# Patient Record
Sex: FEMALE | Race: WHITE
[De-identification: names, ages, dates, MRNs, and addresses within clinical notes are randomized per-mention and may not be internally consistent; named-entity substitution may affect disease eponyms.]

---

## 2022-01-09 ENCOUNTER — HOSPITAL ENCOUNTER (EMERGENCY)
Dept: HOSPITAL 33 - ED | Age: 22
Discharge: HOME | End: 2022-01-09
Payer: COMMERCIAL

## 2022-01-09 VITALS — HEART RATE: 93 BPM | SYSTOLIC BLOOD PRESSURE: 120 MMHG | DIASTOLIC BLOOD PRESSURE: 81 MMHG

## 2022-01-09 VITALS — OXYGEN SATURATION: 97 %

## 2022-01-09 DIAGNOSIS — O98.512: Primary | ICD-10-CM

## 2022-01-09 DIAGNOSIS — B97.29: ICD-10-CM

## 2022-01-09 DIAGNOSIS — J02.8: ICD-10-CM

## 2022-01-09 DIAGNOSIS — U07.1: ICD-10-CM

## 2022-01-09 DIAGNOSIS — Z3A.16: ICD-10-CM

## 2022-01-09 DIAGNOSIS — R05.9: ICD-10-CM

## 2022-01-09 LAB
ALBUMIN SERPL-MCNC: 3.5 G/DL (ref 3.5–5)
ALP SERPL-CCNC: 57 U/L (ref 38–126)
ALT SERPL-CCNC: 22 U/L (ref 0–35)
ANION GAP SERPL CALC-SCNC: 11.4 MEQ/L (ref 5–15)
AST SERPL QL: 26 U/L (ref 14–36)
BILIRUB BLD-MCNC: 0.9 MG/DL (ref 0.2–1.3)
BUN SERPL-MCNC: 2 MG/DL (ref 7–17)
CALCIUM SPEC-MCNC: 8.8 MG/DL (ref 8.4–10.2)
CELLS COUNTED: 100
CHLORIDE SERPL-SCNC: 104 MMOL/L (ref 98–107)
CO2 SERPL-SCNC: 23 MMOL/L (ref 22–30)
CREAT SERPL-MCNC: 0.29 MG/DL (ref 0.52–1.04)
GFR SERPLBLD BASED ON 1.73 SQ M-ARVRAT: > 60 ML/MIN
GLUCOSE SERPL-MCNC: 94 MG/DL (ref 74–106)
GLUCOSE UR-MCNC: NEGATIVE MG/DL
HCT VFR BLD AUTO: 34.4 % (ref 35–47)
HGB BLD-MCNC: 11.6 GM/DL (ref 12–16)
MANUAL DIF COMMENT BLD-IMP: NORMAL
MCH RBC QN AUTO: 30.9 PG (ref 26–32)
MCHC RBC AUTO-ENTMCNC: 33.7 G/DL (ref 32–36)
NEUTS BAND # BLD MANUAL: 1 % (ref 0–2)
PLATELET # BLD AUTO: 222 K/MM3 (ref 150–450)
POTASSIUM SERPLBLD-SCNC: 3.3 MMOL/L (ref 3.5–5.1)
PROT SERPL-MCNC: 6.8 G/DL (ref 6.3–8.2)
PROT UR STRIP-MCNC: NEGATIVE MG/DL
RBC # BLD AUTO: 3.76 M/MM3 (ref 4.1–5.4)
RBC #/AREA URNS HPF: (no result) /HPF (ref 0–2)
SODIUM SERPL-SCNC: 135 MMOL/L (ref 137–145)
TOXIC GRANULES BLD QL SMEAR: (no result)
WBC # BLD AUTO: 10.1 K/MM3 (ref 4–10.5)

## 2022-01-09 PROCEDURE — 80053 COMPREHEN METABOLIC PANEL: CPT

## 2022-01-09 PROCEDURE — U0003 INFECTIOUS AGENT DETECTION BY NUCLEIC ACID (DNA OR RNA); SEVERE ACUTE RESPIRATORY SYNDROME CORONAVIRUS 2 (SARS-COV-2) (CORONAVIRUS DISEASE [COVID-19]), AMPLIFIED PROBE TECHNIQUE, MAKING USE OF HIGH THROUGHPUT TECHNOLOGIES AS DESCRIBED BY CMS-2020-01-R: HCPCS

## 2022-01-09 PROCEDURE — 96374 THER/PROPH/DIAG INJ IV PUSH: CPT

## 2022-01-09 PROCEDURE — 85025 COMPLETE CBC W/AUTO DIFF WBC: CPT

## 2022-01-09 PROCEDURE — 81001 URINALYSIS AUTO W/SCOPE: CPT

## 2022-01-09 PROCEDURE — 36415 COLL VENOUS BLD VENIPUNCTURE: CPT

## 2022-01-09 PROCEDURE — 96375 TX/PRO/DX INJ NEW DRUG ADDON: CPT

## 2022-01-09 PROCEDURE — 93005 ELECTROCARDIOGRAM TRACING: CPT

## 2022-01-09 PROCEDURE — 36000 PLACE NEEDLE IN VEIN: CPT

## 2022-01-09 PROCEDURE — 99284 EMERGENCY DEPT VISIT MOD MDM: CPT

## 2022-01-09 PROCEDURE — 87086 URINE CULTURE/COLONY COUNT: CPT

## 2022-01-09 NOTE — ERPHSYRPT
- History of Present Illness


Time Seen by Provider: 01/09/22 10:37


Source: patient


Exam Limitations: no limitations


Patient Subjective Stated Complaint: Patient c/o cough x 2 months, worsened this

morning. States she tested positive for COVID 1/4. Reports no fevers. Cannot 

smell. States her throat started hurting this morning. Has a "running nose" and 

mucus is yellow.


Triage Nursing Assessment: Patient to ED with complaints of congestion, cough, 

and sore throat. Patient breathing easy at this time. VS wnl, occasional wet 

cough. Breath sounds clear bilaterally.


Physician History: 





Location: generalized


Quality: Malaise, sore throat


Radiation: none


Severity: mild


Duration: several weeks


Timing: gradual 


Modifying factors/associated signs and symptoms: Previous positive at home COVID

test





Patient here with viral pharyngitis in setting of a positive COVID test.  Also 

complains of coughing.  Patient is 16 weeks pregnant.  She has not received the 

vaccine.  She has not seen her PCP for this or her OB/GYN.


Timing/Duration: week(s)


Severity: mild


Modifying Factors: Improves With: other


Associated Symptoms: denies symptoms


Allergies/Adverse Reactions: 








amoxicillin [Amoxicillin] Allergy (Mild, Verified 01/09/22 11:01)


   


Penicillins Allergy (Mild, Verified 01/09/22 11:01)


   





Hx Tetanus, Diphtheria Vaccination/Date Given: Yes


Hx Influenza Vaccination/Date Given: No


Hx Pneumococcal Vaccination/Date Given: No


Immunizations Up to Date: Yes





Travel Risk





- International Travel


Have you traveled outside of the country in past 3 weeks: No





- Coronavirus Screening


Are you exhibiting any of the following symptoms?: Yes


Symptoms: Cough: New Onset, Shortness of Breath, Loss of Taste or Smell





- Vaccine Status


Have you recieved a Covid-19 vaccination: No





- Review of Systems


Constitutional: No Fever, No Chills


Eyes: No Symptoms


Ears, Nose, & Throat: No Symptoms, Other (sore throat )


Respiratory: Cough, No Dyspnea


Cardiac: No Chest Pain, No Edema, No Syncope


Abdominal/Gastrointestinal: No Abdominal Pain, No Nausea, No Vomiting, No 

Diarrhea


Genitourinary Symptoms: No Dysuria


Musculoskeletal: No Back Pain, No Neck Pain


Skin: No Rash


Neurological: No Dizziness, No Focal Weakness, No Sensory Changes


Psychological: No Symptoms


Endocrine: No Symptoms


All Other Systems: Reviewed and Negative





- Past Medical History


Pertinent Past Medical History: Yes


Respiratory History: Asthma





- Past Surgical History


Past Surgical History: Yes


Musculoskeletal: Orthopedic Surgery


Other Surgical History: wrist surgery 2012





- Social History


Smoking Status: Never smoker


Exposure to second hand smoke: Yes


Drug Use: none


Patient Lives Alone: No





- Female History


Hx Last Menstrual Period: 9/12/21


Hx Pregnant Now: Yes


Expected Date of Delivery: 06/21/22


Gestational Age: 14





- Nursing Vital Signs


Nursing Vital Signs: 


                               Initial Vital Signs











Temperature  98 F   01/09/22 10:46


 


Pulse Rate  110 H  01/09/22 10:46


 


Respiratory Rate  18   01/09/22 10:46


 


Blood Pressure  151/93   01/09/22 10:46


 


O2 Sat by Pulse Oximetry  97   01/09/22 10:46








                                   Pain Scale











Pain Intensity                 0

















- Physical Exam


General Appearance: no apparent distress, alert


Eye Exam: PERRL/EOMI, eyes nml inspection


Ears, Nose, Throat Exam: normal ENT inspection, TMs normal, pharynx normal, 

moist mucous membranes


Neck Exam: normal inspection, non-tender, supple, full range of motion


Respiratory Exam: normal breath sounds, lungs clear, No respiratory distress


Cardiovascular Exam: regular rate/rhythm, normal heart sounds, normal peripheral

 pulses


Gastrointestinal/Abdomen Exam: soft, normal bowel sounds, No tenderness, No mass


Back Exam: normal inspection, normal range of motion, No CVA tenderness, No 

vertebral tenderness


Extremity Exam: normal inspection, normal range of motion, pelvis stable


Neurologic Exam: alert, oriented x 3, cooperative, normal mood/affect, nml cereb

ellar function, nml station & gait, sensation nml, No motor deficits


Skin Exam: normal color, warm, dry, No rash


Lymphatic Exam: No adenopathy


**SpO2 Interpretation**: normal


SpO2: 97


Comments: 





01/09/22 11:46


No trismus, able to fully extend neck, normal range of motion of neck without 

pain. Uvula is midline, no


swelling of the mouth, noraml oropharynx. No exudate, no signs of meningitis, no

 floor of mouth


swelling, no hot potato voice on exam. No buccal swelling, no gum bleeding, no 

signs of tooth


abscess/infection.





No obvious deformity, sensation intact, 2+ capillary refill, 2 point tactile 

discrimination intact. 5 out of 5


strength. Full range of motion without pain. Compartments are soft, nontender. 

Overlying skin shows


no tenting, bruising, ecchymosis.





- Course


Nursing assessment & vital signs reviewed: Yes


EKG Interpreted by Me: Sinus Rhythm


Ordered Tests: 


                               Active Orders 24 hr











 Category Date Time Status


 


 EKG-ER Only STAT Care  01/09/22 11:00 Completed


 


 IV Insertion STAT Care  01/09/22 11:00 Completed


 


 CBC W DIFF Stat Lab  01/09/22 11:20 Completed


 


 CMP Stat Lab  01/09/22 11:20 Completed


 


 CULTURE,URINE Stat Lab  01/09/22 11:20 Received


 


 Manual Differential NC Stat Lab  01/09/22 11:20 Completed


 


 UA W/RFX UR CULTURE Stat Lab  01/09/22 11:20 Completed








Medication Summary














Discontinued Medications














Generic Name Dose Route Start Last Admin





  Trade Name Freq  PRN Reason Stop Dose Admin


 


Dexamethasone Sodium Phosphate  8 mg  01/09/22 11:01  01/09/22 11:13





  Dexamethasone Sod Phosphate 10 Mg/Ml  IV  01/09/22 11:02  8 mg





  STAT ONE   Administration


 


Dexamethasone Sodium Phosphate  Confirm  01/09/22 11:12 





  Dexamethasone Sod Phosphate 10 Mg/Ml  Administered  01/09/22 11:13 





  Dose  





  10 mg  





  .ROUTE  





  .STK-MED ONE  


 


Sodium Chloride  1,000 mls @ 999 mls/hr  01/09/22 11:00  01/09/22 11:13





  Sodium Chloride 0.9% 1000 Ml  IV  01/09/22 12:00  999 mls/hr





  .Q1H1M STA   Administration


 


Sodium Chloride  Confirm  01/09/22 11:12 





  Sodium Chloride 0.9% 1000 Ml  Administered  01/09/22 11:13 





  Dose  





  1,000 mls @ ud  





  .ROUTE  





  .STK-MED ONE  


 


Ondansetron HCl  4 mg  01/09/22 11:00  01/09/22 11:14





  Ondansetron Hcl 4 Mg/2 Ml Vial  IV  01/09/22 11:01  4 mg





  STAT ONE   Administration


 


Ondansetron HCl  Confirm  01/09/22 11:12 





  Ondansetron Hcl 4 Mg/2 Ml Vial  Administered  01/09/22 11:13 





  Dose  





  4 mg  





  .ROUTE  





  .STK-MED ONE  











Lab/Rad Data: 


                           Laboratory Result Diagrams





                                 01/09/22 11:20 





                                 01/09/22 11:20 





                               Laboratory Results











  01/09/22 01/09/22 01/09/22 Range/Units





  11:20 11:20 11:20 


 


WBC   10.1   (4.0-10.5)  K/mm3


 


RBC   3.76 L   (4.1-5.4)  M/mm3


 


Hgb   11.6 L   (12.0-16.0)  gm/dl


 


Hct   34.4 L   (35-47)  %


 


MCV   91.5   ()  fl


 


MCH   30.9   (26-32)  pg


 


MCHC   33.7   (32-36)  g/dl


 


RDW   13.3   (11.5-14.0)  %


 


Plt Count   222   (150-450)  K/mm3


 


MPV   10.6   (7.5-11.0)  fl


 


Segmented Neutrophils   53   (36.0-66.0)  %


 


Band Neutrophils   1   (0.0-2.0)  %


 


Lymphocytes (Manual)   42   (24-44)  %


 


Monocytes (Manual)   3   (0.0-12.0)  %


 


Eosinophils (Manual)   1   (0.00-3.0)  %


 


Toxic Granulation   1+   


 


Platelet Estimate   NORMAL   (NORMAL)  


 


RBC Morphology   NORMAL   


 


Sodium  135 L    (137-145)  mmol/L


 


Potassium  3.3 L    (3.5-5.1)  mmol/L


 


Chloride  104    ()  mmol/L


 


Carbon Dioxide  23    (22-30)  mmol/L


 


Anion Gap  11.4    (5-15)  MEQ/L


 


BUN  2 L    (7-17)  mg/dL


 


Creatinine  0.29 L    (0.52-1.04)  mg/dL


 


Estimated GFR  > 60.0    ML/MIN


 


Glucose  94    ()  mg/dL


 


Calcium  8.8    (8.4-10.2)  mg/dL


 


Total Bilirubin  0.90    (0.2-1.3)  mg/dL


 


AST  26    (14-36)  U/L


 


ALT  22    (0-35)  U/L


 


Alkaline Phosphatase  57    ()  U/L


 


Serum Total Protein  6.8    (6.3-8.2)  g/dL


 


Albumin  3.5    (3.5-5.0)  g/dL


 


Urine Color    KAN  (YELLOW)  


 


Urine Appearance    CLOUDY  (CLEAR)  


 


Urine pH    6.0  (5-6)  


 


Ur Specific Gravity    1.014  (1.005-1.025)  


 


Urine Protein    NEGATIVE  (Negative)  


 


Urine Ketones    NEGATIVE  (NEGATIVE)  


 


Urine Blood    NEGATIVE  (0-5)  Sanju/ul


 


Urine Nitrite    NEGATIVE  (NEGATIVE)  


 


Urine Bilirubin    NEGATIVE  (NEGATIVE)  


 


Urine Urobilinogen    4  (0-1)  mg/dL


 


Ur Leukocyte Esterase    LARGE  (NEGATIVE)  


 


Urine WBC (Auto)    11-15  (0-5)  /HPF


 


Urine RBC (Auto)    6-10  (0-2)  /HPF


 


U Epithel Cells (Auto)    MANY  (FEW)  /HPF


 


Urine Bacteria (Auto)    FEW  (NEGATIVE)  /HPF


 


Urine Mucus (Auto)    SLIGHT  (NEGATIVE)  /HPF


 


Urine Culture Reflexed    YES  (NO)  


 


Urine Glucose    NEGATIVE  (NEGATIVE)  mg/dL














- Progress


Progress: improved


Progress Note: 





01/09/22 11:46


We will start an IV, obtain basic labs.  Will give fluids, steroids.  Lungs 

sound clear.  Therefore will not give a breathing treatment or obtain a chest x-

ray.  In the setting of pregnancy we also want to avoid radiation.  Very low 

suspicion for pulmonary embolism or other blood clot.  No other falls or trauma.


01/09/22 15:31


Patient feels improved with medications here.  Plan to discharge patient home at

 this point time.  Follow-up with her PCP.  She return here for any new or 

changing symptoms.





Plan of care was discussed with patient and all questions answered. The patient 

is agreeable to be


discharged home and both verbal and printed discharge instructions were 

provided.The patient agreed


to seek outpatient follow up as discussed. The patient was given strict 

instructions to return to the


emergency department for worsening symptoms or any other emergent concerns. The 

patient


verbalized understanding.





- Departure


Departure Disposition: Home


Clinical Impression: 


 Viral pharyngitis





Condition: Stable


Critical Care Time: No


Referrals: 


TEQUILA SANCHEZ MD [Primary Care Provider] - Follow up/PCP as directed


Instructions:  Cough, Adult (DC)


Additional Instructions: 


See your PCP and OB/GYN for reexam in 24 to 48 hours.  Return here for any new 

or changing symptoms.  Follow-up on the urine culture for your urinalysis today 

with your OB/GYN.

## 2022-06-21 ENCOUNTER — HOSPITAL ENCOUNTER (INPATIENT)
Dept: HOSPITAL 33 - OB | Age: 22
LOS: 3 days | Discharge: HOME | End: 2022-06-24
Attending: FAMILY MEDICINE | Admitting: FAMILY MEDICINE
Payer: COMMERCIAL

## 2022-06-21 DIAGNOSIS — Z3A.40: ICD-10-CM

## 2022-06-21 DIAGNOSIS — D72.829: ICD-10-CM

## 2022-06-21 DIAGNOSIS — R50.9: ICD-10-CM

## 2022-06-21 DIAGNOSIS — Z20.828: ICD-10-CM

## 2022-06-21 LAB
ABO GROUP BLD: (no result)
AMPHETAMINES UR QL: NEGATIVE
BARBITURATES UR QL: NEGATIVE
BASOPHILS # BLD AUTO: 0.04 X10^3/UL (ref 0–0.4)
BENZODIAZ UR QL SCN: NEGATIVE
COCAINE UR QL SCN: NEGATIVE
EOSINOPHIL # BLD AUTO: 0.04 X10^3/UL (ref 0–0.5)
HCT VFR BLD AUTO: 35.2 % (ref 35–47)
HGB BLD-MCNC: 11.8 G/DL (ref 12–16)
LYMPHOCYTES # SPEC AUTO: 2.93 X10^3/UL (ref 1–4.6)
MCH RBC QN AUTO: 31.1 PG (ref 26–32)
MCHC RBC AUTO-ENTMCNC: 33.5 G/DL (ref 32–36)
METHADONE UR QL: NEGATIVE
MONOCYTES # BLD AUTO: 1 X10^3/UL (ref 0–1.3)
OPIATES UR QL: NEGATIVE
PCP UR QL CFM>20 NG/ML: NEGATIVE
PLATELET # BLD AUTO: 233 X10^3/UL (ref 150–450)
RBC # BLD AUTO: 3.79 X10^6/UL (ref 4.1–5.4)
RH BLD: POSITIVE
THC UR QL SCN: NEGATIVE
WBC # BLD AUTO: 13.2 X10^3/UL (ref 4–10.5)

## 2022-06-21 PROCEDURE — 87086 URINE CULTURE/COLONY COUNT: CPT

## 2022-06-21 PROCEDURE — 86900 BLOOD TYPING SEROLOGIC ABO: CPT

## 2022-06-21 PROCEDURE — 85730 THROMBOPLASTIN TIME PARTIAL: CPT

## 2022-06-21 PROCEDURE — 87389 HIV-1 AG W/HIV-1&-2 AB AG IA: CPT

## 2022-06-21 PROCEDURE — 85610 PROTHROMBIN TIME: CPT

## 2022-06-21 PROCEDURE — 90715 TDAP VACCINE 7 YRS/> IM: CPT

## 2022-06-21 PROCEDURE — 86901 BLOOD TYPING SEROLOGIC RH(D): CPT

## 2022-06-21 PROCEDURE — 87340 HEPATITIS B SURFACE AG IA: CPT

## 2022-06-21 PROCEDURE — 81003 URINALYSIS AUTO W/O SCOPE: CPT

## 2022-06-21 PROCEDURE — 36415 COLL VENOUS BLD VENIPUNCTURE: CPT

## 2022-06-21 PROCEDURE — 86850 RBC ANTIBODY SCREEN: CPT

## 2022-06-21 PROCEDURE — 81015 MICROSCOPIC EXAM OF URINE: CPT

## 2022-06-21 PROCEDURE — 76937 US GUIDE VASCULAR ACCESS: CPT

## 2022-06-21 PROCEDURE — 64488 TAP BLOCK BI INJECTION: CPT

## 2022-06-21 PROCEDURE — 80307 DRUG TEST PRSMV CHEM ANLYZR: CPT

## 2022-06-21 PROCEDURE — 85025 COMPLETE CBC W/AUTO DIFF WBC: CPT

## 2022-06-21 PROCEDURE — G0378 HOSPITAL OBSERVATION PER HR: HCPCS

## 2022-06-21 PROCEDURE — 90471 IMMUNIZATION ADMIN: CPT

## 2022-06-21 PROCEDURE — 94799 UNLISTED PULMONARY SVC/PX: CPT

## 2022-06-21 PROCEDURE — 76942 ECHO GUIDE FOR BIOPSY: CPT

## 2022-06-21 PROCEDURE — 62322 NJX INTERLAMINAR LMBR/SAC: CPT

## 2022-06-22 LAB
APTT PPP: 25.5 SECONDS (ref 25.1–36.5)
CRYSTALS UNIDENTIFIED: (no result) /HPF
GLUCOSE UR-MCNC: NEGATIVE MG/DL
INR PPP: 0.98 (ref 0.8–3)
PROT UR STRIP-MCNC: 30 MG/DL
PROTHROMBIN TIME: 10.4 SECONDS (ref 9.4–12.5)
RBC # UR AUTO: (no result) ERY/UL (ref 0–5)
RBC #/AREA URNS HPF: >101 /HPF (ref 0–2)
UA DIPSTICK PNL UR: (no result)
URINE CULTURED INDICATED?: YES

## 2022-06-22 RX ADMIN — SODIUM CHLORIDE, SODIUM LACTATE, POTASSIUM CHLORIDE, CALCIUM CHLORIDE AND DEXTROSE MONOHYDRATE SCH MLS/HR: 5; 600; 310; 30; 20 INJECTION, SOLUTION INTRAVENOUS at 22:09

## 2022-06-23 LAB
BASOPHILS # BLD AUTO: 0.03 X10^3/UL (ref 0–0.4)
BLD SMEAR INTERP: YES
EOSINOPHIL # BLD AUTO: 0.01 X10^3/UL (ref 0–0.5)
HCT VFR BLD AUTO: 30.9 % (ref 35–47)
HGB BLD-MCNC: 10.1 G/DL (ref 12–16)
LYMPHOCYTES # SPEC AUTO: 2.37 X10^3/UL (ref 1–4.6)
MCH RBC QN AUTO: 30.6 PG (ref 26–32)
MCHC RBC AUTO-ENTMCNC: 32.7 G/DL (ref 32–36)
MONOCYTES # BLD AUTO: 1.56 X10^3/UL (ref 0–1.3)
PLATELET # BLD AUTO: 195 X10^3/UL (ref 150–450)
RBC # BLD AUTO: 3.3 X10^6/UL (ref 4.1–5.4)
WBC # BLD AUTO: 17 X10^3/UL (ref 4–10.5)

## 2022-06-23 RX ADMIN — Medication SCH MG: at 08:36

## 2022-06-23 RX ADMIN — HYDROCODONE BITARTRATE AND ACETAMINOPHEN PRN TAB: 5; 325 TABLET ORAL at 21:04

## 2022-06-23 RX ADMIN — HYDROCODONE BITARTRATE AND ACETAMINOPHEN PRN TAB: 5; 325 TABLET ORAL at 10:24

## 2022-06-23 RX ADMIN — SODIUM CHLORIDE, SODIUM LACTATE, POTASSIUM CHLORIDE, CALCIUM CHLORIDE AND DEXTROSE MONOHYDRATE SCH MLS/HR: 5; 600; 310; 30; 20 INJECTION, SOLUTION INTRAVENOUS at 05:59

## 2022-06-23 RX ADMIN — DOCUSATE SODIUM SCH MG: 100 CAPSULE, LIQUID FILLED ORAL at 21:05

## 2022-06-23 RX ADMIN — IBUPROFEN PRN MG: 400 TABLET ORAL at 12:11

## 2022-06-23 RX ADMIN — CLINDAMYCIN IN 5 PERCENT DEXTROSE SCH MLS/HR: 12 INJECTION, SOLUTION INTRAVENOUS at 13:52

## 2022-06-23 RX ADMIN — CLINDAMYCIN IN 5 PERCENT DEXTROSE SCH MLS/HR: 12 INJECTION, SOLUTION INTRAVENOUS at 08:37

## 2022-06-23 RX ADMIN — DOCUSATE SODIUM SCH MG: 100 CAPSULE, LIQUID FILLED ORAL at 08:36

## 2022-06-23 RX ADMIN — CLINDAMYCIN IN 5 PERCENT DEXTROSE SCH MLS/HR: 12 INJECTION, SOLUTION INTRAVENOUS at 22:01

## 2022-06-23 RX ADMIN — DOCUSATE SODIUM SCH: 100 CAPSULE, LIQUID FILLED ORAL at 05:22

## 2022-06-23 RX ADMIN — SODIUM CHLORIDE, SODIUM LACTATE, POTASSIUM CHLORIDE, CALCIUM CHLORIDE AND DEXTROSE MONOHYDRATE SCH MLS/HR: 5; 600; 310; 30; 20 INJECTION, SOLUTION INTRAVENOUS at 13:51

## 2022-06-23 RX ADMIN — IBUPROFEN PRN MG: 400 TABLET ORAL at 23:09

## 2022-06-23 NOTE — OP
SURGERY DATE:   2022



SURGERY TIME:    



PREOPERATIVE DIAGNOSES:    

1.  FAILURE TO PROGRESS IN LABOR.



POSTOPERATIVE DIAGNOSES: 

1.  FAILURE TO PROGRESS IN LABOR.

2.  ASYNCLITIC FETAL PRESENTATION.  



PROCEDURE:     

1.  Primary low transverse  section. 



SURGEON:      [place] M.D.



ANESTHESIA:    Spinal.



ESTIMATED BLOOD LOSS:    400 ml. 



URINE:   200 ml of clear urine.



SPECIMEN:  

1.  Placenta was sent for pathology.



DESCRIPTION OF PROCEDURE:  After informed written consent was obtained, the patient was 
taken to the OR. She had her previously placed laboring epidural dosed for surgery and 
then she was prepped and draped in the usual sterile fashion. After adequate level of 
anesthesia was assessed, a low transverse skin incision was made by knife and carried down 
through the subcutaneous fat to the level of the fascia. The fascia was nicked on both 
sides of the midline and extended horizontal using curved Baker scissors. The superior free 
edge of the fascia was then grasped with Kocher clamps and the underlying rectus muscles 
were dissected free. The same was repeated inferiorly. Next, the peritoneal cavity was 
opened and extended horizontal in a blunt fashion. Bladder blade was inserted and a 
bladder flap was reflected over the lower uterine segment. A horizontal uterine incision 
was made by knife and carried down to the level of the amniotic membranes which were 
carefully artificially ruptured and the incision was extended horizontal. A viable female 
infant was delivered from the vertex presentation with significant caput on the left 
parietal region of the fetal scalp consistent with asynclitic fetal presentation which was 
suspected prior to coming to the OR. The cord was clamped and cut and she was handed off 
to the awaiting nursery team with a good strong cry immediately after delivery. The 
placenta was manually extracted and the uterus was exteriorized. The uterine cavity was 
sponge curetted clean with a Lap sponge. Then, the uterine incision was closed with #1 
chromic in a running, locked fashion. Good closure and good hemostasis were achieved at 
this level. The posterior cul-de-sac was wiped free of blood and clot with a moist Lap 
sponge and the uterus was returned to the peritoneal cavity. Lateral gutters were wiped 
free of blood and clot. There was a small area of oozing in the medial aspect of the 
uterine incision which was remedied with 2-0 Vicryl figure of 8 suture with good 
hemostasis achieved. Next, the fascia was closed with 0 Vicryl in a running fashion. Good 
closure and good hemostasis were achieved at that level. Subcutaneous fat was irrigated 
with warm, sterile saline. Any areas of bleeding were cauterized with electrocautery. 
Finally, the skin layer was closed with 4-0, undyed Vicryl in a running subcuticular 
fashion. Steri-Strips and occlusive dressing were placed over the incision and the patient 
was transferred to the recovery room in good condition.

## 2022-06-23 NOTE — PCM.NOTE
Date and Time: 22





Subjective Assessment: 





nursing reports low grade fever overnight, there was some question of foul odor 

when AROM. no overt chorio present at section. pain is controlled, mild lochia. 

has not been out of bed as of yet





Objective Exam


General Appearance: no apparent distress, alert


Respiratory Exam: normal breath sounds, lungs clear, No respiratory distress


Cardiovascular Exam: regular rate/rhythm, normal heart sounds


Gastrointestinal/Abdomen Exam: soft, other (dressing clean, dry, intact. fundus 

firm below umbilicus), No tenderness, No mass


Extremity Exam: normal inspection, normal range of motion





OBJECTIVE DATA


Vital Signs: 


                               Vital Signs - 24 hr











  Temp Pulse Resp BP BP Pulse Ox


 


 22 06:53   93 H  18   130/62  94 L


 


 22 06:00   98 H  18   134/67  95


 


 22 05:00       95


 


 22 03:58  99 F  100 H  20   129/68  97


 


 22 03:00       95


 


 22 02:00   112 H  18   123/63  98


 


 22 01:00       95


 


 22 00:00       95


 


 22 23:00  0.1 F  90  18   123/60  97


 


 22 22:00  99 F  90  18   120/56  97


 


 22 21:00  98.6 F  109 H  20   113/58  97


 


 22 20:30  98.5 F  116 H  20   116/56  97


 


 22 20:00  98.5 F  103 H  18   128/60  97


 


 22 19:45  98.5 F  103 H  18   128/60  97


 


 22 19:30  98.5 F  103 H  18   128/60  97


 


 22 19:15  100.5 F  128 H  18   142/62  98


 


 22 17:30  98.3 F  80  16  122/67   98


 


 22 17:24  98.3 F  85  16   122/77  98


 


 22 17:00  98.3 F  80  16  122/67   98


 


 22 16:53  98.3 F  85  16  122/77   98


 


 22 16:45  98.3 F  90  16  122/77   98


 


 22 16:30  98.3 F  85  16  122/77   98


 


 22 16:15  99.1 F  93 H  16  114/64   98


 


 22 16:00  98.3 F  85  16  130/78  122/77  98


 


 22 15:45  99.1 F  94 H  16  117/69   98


 


 22 15:30  99.1 F  117 H  16  117/69   97


 


 22 15:15  99.1 F  94 H  16  121/73   97


 


 22 15:00  99.1 F  93 H  16  114/76   97


 


 22 14:45  99.1 F  104 H  18  114/76   98


 


 22 14:30  99.1 F  92 H  20  103/64   98


 


 22 14:15  99.1 F  93 H  20  107/60   99


 


 22 14:00   83  20  115/66   99


 


 22 13:45   72  20  110/62   99


 


 22 13:30   78  20  106/56   99


 


 22 13:15   91 H  18  111/67   100


 


 22 12:45   90  18  111/69   100


 


 22 12:30   89  18  111/76   100


 


 22 12:15   81  18  102/65   100


 


 22 12:00  99 F  77  18  99/61  100/60  100


 


 22 11:45  99 F  89  18  95/57   100


 


 22 11:30  99 F  90  20  91/56   100


 


 22 11:15  99 F  85  20  138/87   99


 


 22 11:00  97.6 F  81  20  142/83   99


 


 22 10:45  97.6 F  72  18  133/92   99


 


 22 10:30  97.6 F  71  16  137/93   99


 


 22 10:15  97.6 F  74  16  135/87   99


 


 22 10:00  97.6 F  76  16  115/58   99


 


 22 09:45  97.6 F  76  16  115/58   99


 


 22 09:30  97.6 F  74  16  112/70   99


 


 22 09:15  97.6 F  77  16  124/73   99


 


 22 09:00  97.6 F  76  16  125/75   99


 


 22 08:45  97.6 F  75  16  122/76   99


 


 22 08:30  97.6 F  83  16  120/73   99


 


 22 08:15  97.6 F  76  16  126/87   99








                        Pain Assessment - Last Documented











Pain Intensity [Bilateral]     0


 


Pain Intensity                 0


 


Pain Scale Used                0-10 Pain Scale











Intake and Output: 


                                 Intake & Output











 22





 11:59 11:59 11:59 11:59


 


Intake Total   2300 4960


 


Output Total    1850


 


Balance   2300 3110


 


Weight   107.048 kg 107.048 kg











Lab Results: 


                            Lab Results-Last 24 Hours











  22 Range/Units





  17:58 13:41 17:27 


 


WBC     (4.0-10.5)  x10^3/uL


 


RBC     (4.1-5.4)  x10^6/uL


 


Hgb     (12.0-16.0)  g/dL


 


Hct     (35-47)  %


 


MCV     ()  fL


 


MCH     (26-32)  pg


 


MCHC     (32-36)  g/dL


 


RDW     (11.5-14.0)  %


 


Plt Count     (150-450)  x10^3/uL


 


MPV     (7.5-11.0)  fL


 


Gran %     (36.0-66.0)  %


 


Immature Gran % (Auto)     (0.00-0.4)  %


 


Nucleat RBC Rel Count     (0.00-0.1)  %


 


Eos # (Auto)     (0-0.5)  x10^3/uL


 


Immature Gran # (Auto)     (0.00-0.03)  x10^3u/L


 


Absolute Lymphs (auto)     (1.0-4.6)  x10^3/uL


 


Absolute Monos (auto)     (0.0-1.3)  x10^3/uL


 


Absolute Nucleated RBC     (0.00-0.01)  x10^3u/L


 


Lymphocytes %     (24.0-44.0)  %


 


Monocytes %     (0.0-12.0)  %


 


Eosinophils %     (0.00-5.0)  %


 


Basophils %     (0.0-0.4)  %


 


Absolute Granulocytes     (1.4-6.9)  x10^3/uL


 


Basophils #     (0-0.4)  x10^3/uL


 


PT    10.4  (9.4-12.5)  SECONDS


 


INR    0.98  (0.8-3.0)  


 


APTT    25.5  (25.1-36.5)  SECONDS


 


Urinalys Dipstick Clnc   MAIN LAB   


 


Urine Color   YELLOW   (YELLOW)  


 


Urine Appearance   SLIGHTLY CLOUDY   (CLEAR)  


 


Urine pH   6.5   (5-6)  


 


Ur Specific Gravity   1.020   (1.005-1.025)  


 


POC Urine Protein Conf   30   (Negative)  


 


Urine Ketones   TRACE   (NEGATIVE)  


 


Urine Nitrite   NEGATIVE   (NEGATIVE)  


 


Urine Bilirubin   SMALL   (NEGATIVE)  


 


Urine Urobilinogen   >=8.0   (0-1)  mg/dL


 


Urine Leukocytes   NEGATIVE   (NEGATIVE)  


 


Urine WBC (Auto)   11-15   (0-5)  /HPF


 


Urine RBC (Auto)   >101   (0-2)  /HPF


 


U Epithel Cells (Auto)   RARE   (FEW)  /HPF


 


Urine Bacteria (Auto)   FEW   (NEGATIVE)  /HPF


 


Urine RBC   LARGE   (0-5)  Sanju/ul


 


Unidentified Crystals   25-50   (NEGATIVE)  /HPF


 


Urine Mucus (Auto)   SLIGHT   (NEGATIVE)  /HPF


 


Ur Culture Indicated?   YES   


 


Urine Glucose   NEGATIVE   (NEGATIVE)  mg/dL


 


Hep Bs Antigen  Pending    


 


HIV 1&2 Ab/P24 Ag 4thGn  Non Reactive    (Non Reactive)  


 


Slides for Path Review     














  22 Range/Units





  04:47 


 


WBC  17.0 H  (4.0-10.5)  x10^3/uL


 


RBC  3.30 L  (4.1-5.4)  x10^6/uL


 


Hgb  10.1 L  (12.0-16.0)  g/dL


 


Hct  30.9 L  (35-47)  %


 


MCV  93.6  ()  fL


 


MCH  30.6  (26-32)  pg


 


MCHC  32.7  (32-36)  g/dL


 


RDW  13.7  (11.5-14.0)  %


 


Plt Count  195  (150-450)  x10^3/uL


 


MPV  11.1 H  (7.5-11.0)  fL


 


Gran %  76.0 H  (36.0-66.0)  %


 


Immature Gran % (Auto)  0.6 H  (0.00-0.4)  %


 


Nucleat RBC Rel Count  0.0  (0.00-0.1)  %


 


Eos # (Auto)  0.01  (0-0.5)  x10^3/uL


 


Immature Gran # (Auto)  0.10 H  (0.00-0.03)  x10^3u/L


 


Absolute Lymphs (auto)  2.37  (1.0-4.6)  x10^3/uL


 


Absolute Monos (auto)  1.56 H  (0.0-1.3)  x10^3/uL


 


Absolute Nucleated RBC  0.00  (0.00-0.01)  x10^3u/L


 


Lymphocytes %  13.9 L  (24.0-44.0)  %


 


Monocytes %  9.2  (0.0-12.0)  %


 


Eosinophils %  0.1  (0.00-5.0)  %


 


Basophils %  0.2  (0.0-0.4)  %


 


Absolute Granulocytes  12.97 H  (1.4-6.9)  x10^3/uL


 


Basophils #  0.03  (0-0.4)  x10^3/uL


 


PT   (9.4-12.5)  SECONDS


 


INR   (0.8-3.0)  


 


APTT   (25.1-36.5)  SECONDS


 


Urinalys Dipstick Clnc   


 


Urine Color   (YELLOW)  


 


Urine Appearance   (CLEAR)  


 


Urine pH   (5-6)  


 


Ur Specific Gravity   (1.005-1.025)  


 


POC Urine Protein Conf   (Negative)  


 


Urine Ketones   (NEGATIVE)  


 


Urine Nitrite   (NEGATIVE)  


 


Urine Bilirubin   (NEGATIVE)  


 


Urine Urobilinogen   (0-1)  mg/dL


 


Urine Leukocytes   (NEGATIVE)  


 


Urine WBC (Auto)   (0-5)  /HPF


 


Urine RBC (Auto)   (0-2)  /HPF


 


U Epithel Cells (Auto)   (FEW)  /HPF


 


Urine Bacteria (Auto)   (NEGATIVE)  /HPF


 


Urine RBC   (0-5)  Sanju/ul


 


Unidentified Crystals   (NEGATIVE)  /HPF


 


Urine Mucus (Auto)   (NEGATIVE)  /HPF


 


Ur Culture Indicated?   


 


Urine Glucose   (NEGATIVE)  mg/dL


 


Hep Bs Antigen   


 


HIV 1&2 Ab/P24 Ag 4thGn   (Non Reactive)  


 


Slides for Path Review  YES  











Multi-Disciplinary Progress Notes: 


                        Multi-Disciplinary Progress Notes





22 18:09 Respiratory Note by Kourtney Pelletier FOR  PER PROTOCOL. NO COMPLICATIONS





Initialized on 22 18:09 - END OF NOTE

















Assessment/Plan


(1)  delivery delivered


Current Visit: Yes   Status: Acute   Code(s): O82 - ENCOUNTER FOR  

DELIVERY WITHOUT INDICATION   





(2) Leukocytosis


Current Visit: Yes   Status: Acute   


Assessment & Plan: 


with low grade fever will treat with IV cleocin, repeat cbc tomorrow


Code(s): D72.829 - ELEVATED WHITE BLOOD CELL COUNT, UNSPECIFIED

## 2022-06-24 VITALS — HEART RATE: 96 BPM | SYSTOLIC BLOOD PRESSURE: 132 MMHG | DIASTOLIC BLOOD PRESSURE: 75 MMHG | OXYGEN SATURATION: 97 %

## 2022-06-24 LAB
BASOPHILS # BLD AUTO: 0.04 X10^3/UL (ref 0–0.4)
EOSINOPHIL # BLD AUTO: 0.13 X10^3/UL (ref 0–0.5)
HCT VFR BLD AUTO: 30.7 % (ref 35–47)
HGB BLD-MCNC: 10 G/DL (ref 12–16)
LYMPHOCYTES # SPEC AUTO: 2.72 X10^3/UL (ref 1–4.6)
MCH RBC QN AUTO: 31.1 PG (ref 26–32)
MCHC RBC AUTO-ENTMCNC: 32.6 G/DL (ref 32–36)
MONOCYTES # BLD AUTO: 1.43 X10^3/UL (ref 0–1.3)
PLATELET # BLD AUTO: 187 X10^3/UL (ref 150–450)
RBC # BLD AUTO: 3.22 X10^6/UL (ref 4.1–5.4)
WBC # BLD AUTO: 15.3 X10^3/UL (ref 4–10.5)

## 2022-06-24 RX ADMIN — Medication SCH MG: at 10:00

## 2022-06-24 RX ADMIN — IBUPROFEN PRN MG: 400 TABLET ORAL at 05:54

## 2022-06-24 RX ADMIN — HYDROCODONE BITARTRATE AND ACETAMINOPHEN PRN TAB: 5; 325 TABLET ORAL at 15:13

## 2022-06-24 RX ADMIN — DOCUSATE SODIUM SCH MG: 100 CAPSULE, LIQUID FILLED ORAL at 10:00

## 2022-06-24 RX ADMIN — HYDROCODONE BITARTRATE AND ACETAMINOPHEN PRN TAB: 5; 325 TABLET ORAL at 10:00

## 2022-06-24 RX ADMIN — HYDROCODONE BITARTRATE AND ACETAMINOPHEN PRN TAB: 5; 325 TABLET ORAL at 03:11

## 2022-06-24 RX ADMIN — CLINDAMYCIN IN 5 PERCENT DEXTROSE SCH MLS/HR: 12 INJECTION, SOLUTION INTRAVENOUS at 05:54

## 2022-09-26 ENCOUNTER — HOSPITAL ENCOUNTER (EMERGENCY)
Dept: HOSPITAL 33 - ED | Age: 22
Discharge: HOME | End: 2022-09-26
Payer: COMMERCIAL

## 2022-09-26 VITALS — SYSTOLIC BLOOD PRESSURE: 126 MMHG | DIASTOLIC BLOOD PRESSURE: 79 MMHG | HEART RATE: 65 BPM

## 2022-09-26 VITALS — OXYGEN SATURATION: 100 %

## 2022-09-26 DIAGNOSIS — L73.2: Primary | ICD-10-CM

## 2022-09-26 DIAGNOSIS — Z28.310: ICD-10-CM

## 2022-09-26 DIAGNOSIS — Z79.891: ICD-10-CM

## 2022-09-26 DIAGNOSIS — L02.213: ICD-10-CM

## 2022-09-26 PROCEDURE — 96372 THER/PROPH/DIAG INJ SC/IM: CPT

## 2022-09-26 PROCEDURE — 99283 EMERGENCY DEPT VISIT LOW MDM: CPT

## 2022-09-26 PROCEDURE — 87070 CULTURE OTHR SPECIMN AEROBIC: CPT

## 2022-09-26 PROCEDURE — 10060 I&D ABSCESS SIMPLE/SINGLE: CPT

## 2022-09-26 RX ADMIN — SULFAMETHOXAZOLE AND TRIMETHOPRIM ONE: 800; 160 TABLET ORAL at 20:20

## 2022-09-26 RX ADMIN — SULFAMETHOXAZOLE AND TRIMETHOPRIM ONE TAB: 800; 160 TABLET ORAL at 20:15

## 2022-09-26 NOTE — ERPHSYRPT
- History of Present Illness


Time Seen by Provider: 09/26/22 19:11


Source: patient


Exam Limitations: no limitations


Physician History: 





This is a 22-year-old white female who has had recurrent abscesses in the chest,

axilla, breast and groin area.  She has never been diagnosed with hidradenitis 

suppurativa.  She has an appointment to see a dermatologist this coming December 2022.  Patient is allergic to penicillin.  In the last 2 days she has noticed 

boil and abscess that was increasing in size and tenderness and redness on the 

right of central chest wall.  She is not any fevers.


Timing/Duration: day(s) (2), worse


Quality: painful


Severity: mild (Monitor)


Location: other (Skin of the chest wall on the right of midline)


Possible Causes: other (Hidradenitis suppurativa)


Associated Symptoms: other (Redness and tenderness and warmth)


Allergies/Adverse Reactions: 








amoxicillin [Amoxicillin] Allergy (Mild, Verified 09/26/22 19:40)


   


Penicillins Allergy (Mild, Verified 09/26/22 19:40)


   





Hx Tetanus, Diphtheria Vaccination/Date Given: Yes


Hx Influenza Vaccination/Date Given: No


Hx Pneumococcal Vaccination/Date Given: No





Travel Risk





- International Travel


Have you traveled outside of the country in past 3 weeks: No





- Coronavirus Screening


Are you exhibiting any of the following symptoms?: No


Close contact with a COVID-19 positive Pt in past 14-21 Days: No





- Vaccine Status


Have you recieved a Covid-19 vaccination: No





- Review of Systems


Constitutional: No Symptoms


Eyes: No Symptoms


Ears, Nose, & Throat: No Symptoms


Respiratory: No Symptoms


Cardiac: No Symptoms


Abdominal/Gastrointestinal: No Symptoms, Appetite Changes


Genitourinary Symptoms: No Symptoms, Other


Skin: Cellulitis, Other (Abscess right of midline chest wall)


Neurological: No Symptoms


Psychological: No Symptoms


Endocrine: No Symptoms


Hematologic/Lymphatic: No Symptoms


Immunological/Allergic: No Symptoms


All Other Systems: Reviewed and Negative





- Past Medical History


Pertinent Past Medical History: Yes


Neurological History: No Pertinent History


ENT History: No Pertinent History


Cardiac History: No Pertinent History


Respiratory History: No Pertinent History


Endocrine Medical History: No Pertinent History


Musculoskeletal History: No Pertinent History


GI Medical History: No Pertinent History


 History: No Pertinent History


Psycho-Social History: No Pertinent History


Female Reproductive Disorders: No Pertinent History


Other Medical History: Right Wrist Fracture with surgery





- Past Surgical History


Past Surgical History: Yes


Neuro Surgical History: No Pertinent History


Cardiac: No Pertinent History


Respiratory: No Pertinent History


Gastrointestinal: No Pertinent History


Genitourinary: No Pertinent History


Musculoskeletal: Other


Female Surgical History: No Pertinent History


Other Surgical History: Right Wrist





- Social History


Smoking Status: Never smoker


Exposure to second hand smoke: No


Drug Use: none


Patient Lives Alone: No





- Nursing Vital Signs


Nursing Vital Signs: 


                               Initial Vital Signs











Temperature  98.3 F   09/26/22 19:28


 


Pulse Rate  72   09/26/22 19:28


 


Respiratory Rate  16   09/26/22 19:28


 


Blood Pressure  123/76   09/26/22 19:28


 


O2 Sat by Pulse Oximetry  100   09/26/22 19:28








                                   Pain Scale











Pain Intensity                 10

















- Physical Exam


General Appearance: no apparent distress, alert, anxiety


Eye Exam: PERRL/EOMI, eyes nml inspection


Ears, Nose, Throat Exam: normal ENT inspection, moist mucous membranes


Neck Exam: normal inspection, non-tender, supple, full range of motion


Respiratory Exam: normal breath sounds, airway intact, No chest tenderness, No 

respiratory distress


Gastrointestinal/Abdomen Exam: No tenderness


Pelvic Exam: not done


Rectal Exam: not done


Back Exam: normal inspection, normal range of motion, No CVA tenderness, No 

vertebral tenderness


Extremity Exam: normal inspection, normal range of motion, pelvis stable


Neurologic Exam: alert, oriented x 3, cooperative, CNs II-XII nml as tested, 

normal mood/affect, nml cerebellar function, nml station & gait, sensation nml


Skin Exam: other (Cellulitis and abscess chest wall with associated small 

satellite skin lesions)


Lymphatic Exam: No adenopathy


**SpO2 Interpretation**: normal





Procedures





- Incision and Drainage


Time of Procedure: 20:25


Timeout: Performed


Site: Chest wall.


Anesthesia: 1% Lidocaine


cc's of anesthesia: 3


Blade Size: 15


I & D Procedure: betadine prep, culture obtained, irrigated with normal saline


Results: moderate amount pus





- Course


Nursing assessment & vital signs reviewed: Yes


Ordered Tests: 


                               Active Orders 24 hr











 Category Date Time Status


 


 Wound Care STAT Care  09/26/22 20:05 Active


 


 CULTURE,WOUND Stat Lab  09/26/22 20:05 Ordered








Medication Summary














Discontinued Medications














Generic Name Dose Route Start Last Admin





  Trade Name Freq  PRN Reason Stop Dose Admin


 


Hydrocodone Bitart/Acetaminophen  1 tab  09/26/22 20:05  09/26/22 20:15





  Hydrocodone/Apap 5/325 Mg Tablet  PO  09/26/22 20:06  1 tab





  STAT ONE   Administration


 


Hydrocodone Bitart/Acetaminophen  2 tab  09/26/22 20:06 





  Hydrocodone/Apap 5/325 Mg Tablet  PO  09/26/22 20:07 





  SENT HOME W/ PATIENT ONE  


 


Hydrocodone Bitart/Acetaminophen  Confirm  09/26/22 20:14 





  Hydrocodone/Apap 5/325 Mg Tablet  Administered  09/26/22 20:15 





  Dose  





  1 tab  





  .ROUTE  





  .STK-MED ONE  


 


Doxycycline Hyclate  100 mg  09/26/22 20:30 





  Doxycycline Hyclate 100 Mg Tablet  PO  09/26/22 20:31 





  STAT ONE  


 


Ibuprofen  400 mg  09/26/22 20:05  09/26/22 20:15





  Ibuprofen 400 Mg Tablet  PO  09/26/22 20:06  400 mg





  STAT ONE   Administration


 


Ibuprofen  Confirm  09/26/22 20:14 





  Ibuprofen 400 Mg Tablet  Administered  09/26/22 20:15 





  Dose  





  400 mg  





  .ROUTE  





  .STK-MED ONE  


 


Lidocaine HCl  5 ml  09/26/22 20:05  09/26/22 20:15





  Lidocaine Hcl 1% 20 Ml Mdv*** 20 Ml Ml  IJ  09/26/22 20:06  5 ml





  STAT ONE   Administration


 


Lidocaine HCl  Confirm  09/26/22 20:10 





  Lidocaine Hcl 1% 20 Ml Mdv*** 20 Ml Ml  Administered  09/26/22 20:11 





  Dose  





  3 ml  





  .ROUTE  





  .STK-MED ONE  


 


Trimethoprim/Sulfamethoxazole  1 tab  09/26/22 20:07  09/26/22 20:20





  Smz/Tmp Ds Tablet 1 Tablet  PO  09/26/22 20:08  Not Given





  STAT ONE  


 


Trimethoprim/Sulfamethoxazole  Confirm  09/26/22 20:14 





  Smz/Tmp Ds Tablet 1 Tablet  Administered  09/26/22 20:15 





  Dose  





  1 tab  





  PO  





  .STK-MED ONE  














- Progress


Progress: improved


Counseled pt/family regarding: diagnosis, need for follow-up





- Departure


Departure Disposition: Home


Clinical Impression: 


 Hidradenitis suppurativa





Condition: Stable


Critical Care Time: No


Referrals: 


TEQUILA SANCHEZ MD [Primary Care Provider] - Follow up/PCP as directed


Additional Instructions: 


When you go home tonight wash the site and rinse out in the shower.  Blot dry 

use a hairdryer and cover the wound with a bandage.  Do this daily.  Make sure 

you have expressed the area as discussed.  Follow-up with Dr. Sanchez's office 

tomorrow for further evaluation and management.  Keep your appointment with your

dermatologist


Prescriptions: 


Hydrocodone/APAP 5/325*** [Norco 5/325 mg***] 1 each PO Q8H PRN PRN #6 tablet 

MDD 3


 PRN Reason: Pain


Doxycycline Hyclate 100 mg*** [Vibramycin 100 MG***] 100 mg PO BID #14 tab